# Patient Record
Sex: FEMALE | Race: WHITE | NOT HISPANIC OR LATINO | Employment: UNEMPLOYED
[De-identification: names, ages, dates, MRNs, and addresses within clinical notes are randomized per-mention and may not be internally consistent; named-entity substitution may affect disease eponyms.]

---

## 2023-11-18 ENCOUNTER — HEALTH MAINTENANCE LETTER (OUTPATIENT)
Age: 23
End: 2023-11-18

## 2023-11-29 ENCOUNTER — OFFICE VISIT (OUTPATIENT)
Dept: FAMILY MEDICINE | Facility: CLINIC | Age: 23
End: 2023-11-29
Payer: COMMERCIAL

## 2023-11-29 VITALS
HEART RATE: 81 BPM | SYSTOLIC BLOOD PRESSURE: 111 MMHG | OXYGEN SATURATION: 95 % | DIASTOLIC BLOOD PRESSURE: 77 MMHG | RESPIRATION RATE: 15 BRPM | WEIGHT: 148 LBS | BODY MASS INDEX: 23.78 KG/M2 | TEMPERATURE: 97.1 F | HEIGHT: 66 IN

## 2023-11-29 DIAGNOSIS — Z23 NEED FOR COVID-19 VACCINE: ICD-10-CM

## 2023-11-29 DIAGNOSIS — R45.851 SUICIDAL IDEATION: ICD-10-CM

## 2023-11-29 DIAGNOSIS — Z23 NEED FOR PROPHYLACTIC VACCINATION AND INOCULATION AGAINST INFLUENZA: ICD-10-CM

## 2023-11-29 DIAGNOSIS — F33.9 EPISODE OF RECURRENT MAJOR DEPRESSIVE DISORDER, UNSPECIFIED DEPRESSION EPISODE SEVERITY (H): ICD-10-CM

## 2023-11-29 DIAGNOSIS — Z83.49 FAMILY HISTORY OF THYROID DISEASE: Primary | ICD-10-CM

## 2023-11-29 LAB — TSH SERPL DL<=0.005 MIU/L-ACNC: 1.21 UIU/ML (ref 0.3–4.2)

## 2023-11-29 PROCEDURE — 90471 IMMUNIZATION ADMIN: CPT | Performed by: FAMILY MEDICINE

## 2023-11-29 PROCEDURE — 91320 SARSCV2 VAC 30MCG TRS-SUC IM: CPT | Performed by: FAMILY MEDICINE

## 2023-11-29 PROCEDURE — 90480 ADMN SARSCOV2 VAC 1/ONLY CMP: CPT | Performed by: FAMILY MEDICINE

## 2023-11-29 PROCEDURE — 99203 OFFICE O/P NEW LOW 30 MIN: CPT | Mod: 25 | Performed by: FAMILY MEDICINE

## 2023-11-29 PROCEDURE — 36415 COLL VENOUS BLD VENIPUNCTURE: CPT | Performed by: FAMILY MEDICINE

## 2023-11-29 PROCEDURE — 84443 ASSAY THYROID STIM HORMONE: CPT | Performed by: FAMILY MEDICINE

## 2023-11-29 PROCEDURE — 90686 IIV4 VACC NO PRSV 0.5 ML IM: CPT | Performed by: FAMILY MEDICINE

## 2023-11-29 ASSESSMENT — PAIN SCALES - GENERAL: PAINLEVEL: NO PAIN (0)

## 2023-11-29 ASSESSMENT — PATIENT HEALTH QUESTIONNAIRE - PHQ9
SUM OF ALL RESPONSES TO PHQ QUESTIONS 1-9: 21
SUM OF ALL RESPONSES TO PHQ QUESTIONS 1-9: 21
10. IF YOU CHECKED OFF ANY PROBLEMS, HOW DIFFICULT HAVE THESE PROBLEMS MADE IT FOR YOU TO DO YOUR WORK, TAKE CARE OF THINGS AT HOME, OR GET ALONG WITH OTHER PEOPLE: EXTREMELY DIFFICULT

## 2023-11-29 NOTE — PROGRESS NOTES
Assessment & Plan     Family history of thyroid disease  - reviewed labs from 2021 which were normal  - recheck again today   - TSH with free T4 reflex; Future    Episode of recurrent major depressive disorder, unspecified depression episode severity (H24)  Suicidal ideation  - She has been assigned a new therapist and will establish care. She has passive SI, no active thoughts. No weapons at home. She does have good support system and crisis numbers.     Sukhjinder Neri MD  [facility]    Yanick Yeager is a 23 year old, presenting for the following health issues:  Establish Care (Est Care )        11/29/2023    12:54 PM   Additional Questions   Roomed by Donna Dixon         11/29/2023    12:54 PM   Patient Reported Additional Medications   Patient reports taking the following new medications Med Rec Meds       History of Present Illness       Reason for visit:  Seeking a GP; questions about a resolved illness & hypothyroidism    She eats 2-3 servings of fruits and vegetables daily.She consumes 0 sweetened beverage(s) daily.She exercises with enough effort to increase her heart rate 9 or less minutes per day.  She exercises with enough effort to increase her heart rate 3 or less days per week. She is missing 4 dose(s) of medications per week.  She is not taking prescribed medications regularly due to remembering to take.     Aug 2023 - had gut infection while travelling. She was in [place] and had to go to ER. She was told that it was borne illness and was prescribed antibiotics, probiotics, antispasmodic and tylenol. When she scheduled appointment, she was still having lactose intolerance. Now that has also faded away. She is feeling back to her baseline.     Mom and her sister and maternal grandmother - have had problems with hypothyroidism. She has a therapist that told her that she had chronic depression and anxiety. She is concerned about thyroid.     As of  "yesterday she has a new therapist. She has had suicidal thoughts over the last few years. She has thoughts of suicide but no planning. No history of previous suicide attempts. No weapons at home. She has a good support system. She has crisis numbers.     Review of Systems         Objective    /77 (BP Location: Left arm, Patient Position: Sitting, Cuff Size: Adult Regular)   Pulse 81   Temp 97.1  F (36.2  C) (Temporal)   Resp 15   Ht 1.67 m (5' 5.75\")   Wt 67.1 kg (148 lb)   LMP 11/20/2023 (Approximate)   SpO2 95%   BMI 24.07 kg/m    Body mass index is 24.07 kg/m .  Physical Exam                         "

## 2023-11-29 NOTE — COMMUNITY RESOURCES LIST (ENGLISH)
11/29/2023   [facility]  N/A  For questions about this resource list or additional care needs, please contact your primary care clinic or care manager.  Phone: [phone]   Email: N/A   Address: [address] [place] [id number]   Hours: N/A        Transportation       Free or low-cost transportation  1  [facility] Distance: 2.76 miles      In-Person   [address] [place] [address]  Language: English, North Korean  Hours: Mon 9:00 AM - 5:00 PM , Tue 9:30 AM - 7:00 PM , Wed 9:00 AM - 5:00 PM , Thu 9:30 AM - 7:00 PM , Fri 9:00 AM - 5:00 PM  Fees: Free   Phone: (476) 949-1862 Email: mandyus@[employer] Website: http://www.[employer]     2  [facility] Distance: 3.62 miles      In-Person   [address] [place] [address]  Language: English  Hours: Mon - Fri 8:00 AM - 3:00 PM  Fees: Free   Phone: (161) 615-5742 Ext.14 Email: neighborhood@[facility].[employer] Website: http://[email or web address].Curse     Transportation to medical appointments  3  [facility] Transportation Programs - Non-Emergency Medical Transportation Distance: 3.73 miles      In-Person   [address] [facility] [address] [place] [address]  Language: English, Zimbabwean, North Korean  Hours: Mon - Fri 7:00 AM - 6:00 PM  Fees: Insurance   Phone: (538) 475-5794 Ext.4424 Email: lenin@[email or web address].net Website: http://www.[place] [employer]net/[place]/     4  Assisted Transport Distance: 4.31 miles      In-Person   [address] [place] [address]  Language: English, [place] Korean  Hours: Mon - Sun Appt. Only  Fees: Self Pay   Phone: (404) 753-4876 Email: chelita@[employer] Website: http://www.[employer]/          Important Numbers & Websites       Emergency Services   911  City Services   311  Poison Control   (768) 638-1000  Suicide Prevention Lifeline   (120) 671-1000 (TALK)  Child Abuse Hotline   (703) 762-7262 (4-A-Child)  Sexual Assault Hotline   (197)  415-0304 (HOPE)  National Runaway Safeline   (561) 846-5970 (RUNAWAY)  All-Options Talkline   (259) 541-3054  Substance Abuse Referral   (605) 375-9393 (HELP)

## 2023-11-29 NOTE — COMMUNITY RESOURCES LIST (ENGLISH)
11/29/2023   [facility]  N/A  For questions about this resource list or additional care needs, please contact your primary care clinic or care manager.  Phone: [phone]   Email: N/A   Address: [address] [place] [address]   Hours: N/A        Transportation       Free or low-cost transportation  1  Small [facility] Distance: 2.76 miles      In-Person   [address] [place] [address]  Language: English, Omani  Hours: Mon 9:00 AM - 5:00 PM , Tue 9:30 AM - 7:00 PM , Wed 9:00 AM - 5:00 PM , Thu 9:30 AM - 7:00 PM , Fri 9:00 AM - 5:00 PM  Fees: Free   Phone: (867) 326-7625 Email: mandyus@[employer] Website: http://www.[employer]     2  [facility] Distance: 3.62 miles      In-Person   [address] [place] [address]  Language: English  Hours: Mon - Fri 8:00 AM - 3:00 PM  Fees: Free   Phone: (994) 107-2370 Ext.14 Email: neighborhood@[facility].[employer] Website: http://www.[facility].[employer]     Transportation to medical appointments  3  [facility] Transportation Programs - Non-Emergency Medical Transportation Distance: 3.73 miles      In-Person   [address] [facility] [address] [place] [address]  Language: English, American, Omani  Hours: Mon - Fri 7:00 AM - 6:00 PM  Fees: Insurance   Phone: (416) 497-3281 Ext.4451 Email: [email or web address].net Website: http://www.[employer].net/[place]/     4  Assisted Transport Distance: 4.31 miles      In-Person   [address] [place] [address]  Language: English, Omani  Hours: Mon - Sun Appt. Only  Fees: Self Pay   Phone: (172) 677-1806 Email: chelita@[employer] Website: http://www.[employer]/          Important Numbers & Websites       Emergency Services   911  City Services   311  Poison Control   (203) 191-7051  Suicide Prevention Lifeline   (252) 631-4408 (TALK)  Child Abuse Hotline   (170) 256-8124 (4-A-Child)  Sexual Assault Hotline   (664)  388-1320 (HOPE)  National Runaway Safeline   (979) 620-6162 (RUNAWAY)  All-Options Talkline   (216) 270-9236  Substance Abuse Referral   (101) 741-5600 (HELP)

## 2024-12-29 ENCOUNTER — HEALTH MAINTENANCE LETTER (OUTPATIENT)
Age: 24
End: 2024-12-29